# Patient Record
Sex: MALE | Race: WHITE | NOT HISPANIC OR LATINO | URBAN - METROPOLITAN AREA
[De-identification: names, ages, dates, MRNs, and addresses within clinical notes are randomized per-mention and may not be internally consistent; named-entity substitution may affect disease eponyms.]

---

## 2017-12-05 PROBLEM — 266433003 GASTRO-ESOPHAGEAL REFLUX DISEASE WITH ESOPHAGITIS: Status: ACTIVE | Noted: 2017-12-05

## 2020-06-02 ENCOUNTER — OFFICE VISIT (OUTPATIENT)
Dept: URBAN - METROPOLITAN AREA CLINIC 35 | Facility: CLINIC | Age: 55
End: 2020-06-02

## 2020-06-02 VITALS — BODY MASS INDEX: 31.5 KG/M2 | WEIGHT: 225 LBS | HEIGHT: 71 IN

## 2020-06-02 RX ORDER — PIOGLITAZONE 45 MG/1
1 TABLET TABLET ORAL ONCE A DAY
Status: ACTIVE | COMMUNITY

## 2020-06-02 RX ORDER — PANTOPRAZOLE 20 MG/1
1 TABLET TABLET, DELAYED RELEASE ORAL ONCE A DAY
Qty: 90 TABLET | Refills: 3 | Status: ACTIVE | COMMUNITY
Start: 2017-12-05

## 2020-06-02 RX ORDER — PANTOPRAZOLE 20 MG/1
1 TABLET TABLET, DELAYED RELEASE ORAL ONCE A DAY
Qty: 90 TABLET | Refills: 1 | OUTPATIENT
Start: 2017-12-05

## 2020-06-02 RX ORDER — ROSUVASTATIN CALCIUM 40 MG/1
1 TABLET TABLET, FILM COATED ORAL ONCE A DAY
Status: ACTIVE | COMMUNITY

## 2020-06-02 RX ORDER — INSULIN DETEMIR 100 [IU]/ML
INJECT 20 UNITS UNDER THE SKIN TWICE DAILY BEFORE MEAL(S) INJECTION, SOLUTION SUBCUTANEOUS
Qty: 15 UNSPECIFIED | Refills: 5 | Status: ACTIVE | COMMUNITY

## 2020-06-02 RX ORDER — LISINOPRIL 10 MG/1
1 TABLET TABLET ORAL ONCE A DAY
Status: ACTIVE | COMMUNITY

## 2020-06-02 NOTE — HPI-MIGRATED HPI
;   ;     Gastroesophageal reflux disease : He admit control of symptoms with the use of  Pantoprazole 20 mg 1 QD.  Last visit (12/3/2019)  Patient admits the continued use of Pantoprazole 20 mg. He denies that his symptoms are controlled with the use of Pantoprazole 20 mg. He admits that he will have break though symptoms several times a week.   Last visit (11/26/2019) Continue Pantoprazole Sodium Tablet Delayed Release, 20 MG,   Last visit (3/26/2019) He continue to admit control of symptoms with the use of Pantoprazole 20 mg 1 QD.  He denies any breakthrough episodes.  Denies dysphagia, early satiety, bloating, gas, abdominal pain, nausea.   Last visit (12/5/2017) Patient presents today for a follow up of GERD.  He continue to take Pantoprazole 40 mg daily with good control of symptoms.  He denies any breakthrough episodes.  Denies dysphagia, early satiety, bloating, gas, abdominal pain, nausea. ;   Montalvo's Esophagus : Patient presents today via televisit with consent for a follow up of Montalvo's Esophagus, which he was found to have via EGD completed Dec. 7, 2016.  He continue to take Pantoprazole 20 MG, 1 QD.  Denies globus, dysphagia, heartburn.reflux or odynophagia.  Need to repeat EGD in 2019.    Last visit (12/3/2019) Patient presents today for Montalvo's Esophagus. Patient admits a sour taste in his mouth at this time.   Last visit (3/26/2019) Patient presents today for a follow up of Montalvo's esophagus, which he was found to have via EGD completed Dec. 7, 2016.  Start Pantoprazole 20 MG, 1 QD Notes: EGD in 2019. ;

## 2020-12-01 ENCOUNTER — OFFICE VISIT (OUTPATIENT)
Dept: URBAN - METROPOLITAN AREA CLINIC 31 | Facility: CLINIC | Age: 55
End: 2020-12-01

## 2020-12-08 ENCOUNTER — OFFICE VISIT (OUTPATIENT)
Dept: URBAN - METROPOLITAN AREA CLINIC 31 | Facility: CLINIC | Age: 55
End: 2020-12-08

## 2020-12-08 VITALS — TEMPERATURE: 96.8 F | HEIGHT: 71 IN | WEIGHT: 230 LBS | BODY MASS INDEX: 32.2 KG/M2

## 2020-12-08 RX ORDER — ROSUVASTATIN CALCIUM 40 MG/1
1 TABLET TABLET, FILM COATED ORAL ONCE A DAY
Status: ACTIVE | COMMUNITY

## 2020-12-08 RX ORDER — INSULIN DETEMIR 100 [IU]/ML
INJECT 20 UNITS UNDER THE SKIN TWICE DAILY BEFORE MEAL(S) INJECTION, SOLUTION SUBCUTANEOUS
Qty: 15 UNSPECIFIED | Refills: 5 | Status: ACTIVE | COMMUNITY

## 2020-12-08 RX ORDER — PANTOPRAZOLE 20 MG/1
1 TABLET TABLET, DELAYED RELEASE ORAL ONCE A DAY
Qty: 90 TABLET | Refills: 1 | Status: ACTIVE | COMMUNITY
Start: 2017-12-05

## 2020-12-08 RX ORDER — PANTOPRAZOLE 20 MG/1
1 TABLET TABLET, DELAYED RELEASE ORAL ONCE A DAY
Qty: 90 TABLET | Refills: 1 | OUTPATIENT
Start: 2017-12-05

## 2020-12-08 RX ORDER — PIOGLITAZONE 45 MG/1
1 TABLET TABLET ORAL ONCE A DAY
Status: ACTIVE | COMMUNITY

## 2020-12-08 RX ORDER — LISINOPRIL 10 MG/1
1 TABLET TABLET ORAL ONCE A DAY
Status: ACTIVE | COMMUNITY

## 2020-12-08 NOTE — HPI-MIGRATED HPI
;   ;     Gastroesophageal reflux disease : Patient admits control of symptoms with the use of Pantoprazole 20 mg 1 QD.     Last visit (6/2/2020) He admit control of symptoms with the use of  Pantoprazole 20 mg 1 QD.  Last visit (12/3/2019)  Patient admits the continued use of Pantoprazole 20 mg. He denies that his symptoms are controlled with the use of Pantoprazole 20 mg. He admits that he will have break though symptoms several times a week.   Last visit (11/26/2019) Continue Pantoprazole Sodium Tablet Delayed Release, 20 MG,   Last visit (3/26/2019) He continue to admit control of symptoms with the use of Pantoprazole 20 mg 1 QD.  He denies any breakthrough episodes.  Denies dysphagia, early satiety, bloating, gas, abdominal pain, nausea.   Last visit (12/5/2017) Patient presents today for a follow up of GERD.  He continue to take Pantoprazole 40 mg daily with good control of symptoms.  He denies any breakthrough episodes.  Denies dysphagia, early satiety, bloating, gas, abdominal pain, nausea. ;   Montalvo's Esophagus : Patient presents today for a follow up of Montalvo's Esophagus. Continue to take Pantoprazole 20 MG 1 QD.   He denies any associated symptoms at this time.    Last visit (6/2/2020) Patient presents today via televisit with consent for a follow up of Montalvo's Esophagus, which he was found to have via EGD completed Dec. 7, 2016.  He continue to take Pantoprazole 20 MG, 1 QD.  Denies globus, dysphagia, heartburn/reflux or odynophagia.  Need to repeat EGD in 2019.    Last visit (12/3/2019) Patient presents today for Montalvo's Esophagus. Patient admits a sour taste in his mouth at this time.   Last visit (3/26/2019) Patient presents today for a follow up of Montalvo's esophagus, which he was found to have via EGD completed Dec. 7, 2016.  Start Pantoprazole 20 MG, 1 QD Notes: EGD in 2019. ;

## 2021-07-20 ENCOUNTER — OFFICE VISIT (OUTPATIENT)
Dept: URBAN - METROPOLITAN AREA CLINIC 31 | Facility: CLINIC | Age: 56
End: 2021-07-20

## 2021-07-20 VITALS
DIASTOLIC BLOOD PRESSURE: 88 MMHG | HEART RATE: 74 BPM | HEIGHT: 71 IN | OXYGEN SATURATION: 95 % | SYSTOLIC BLOOD PRESSURE: 134 MMHG | WEIGHT: 200 LBS | BODY MASS INDEX: 28 KG/M2

## 2021-07-20 RX ORDER — PANTOPRAZOLE 20 MG/1
1 TABLET TABLET, DELAYED RELEASE ORAL ONCE A DAY
Qty: 90 TABLET | Refills: 1 | Status: ACTIVE | COMMUNITY
Start: 2017-12-05

## 2021-07-20 RX ORDER — EMPAGLIFLOZIN 10 MG/1
TABLET, FILM COATED ORAL
Qty: 30 | Status: ACTIVE | COMMUNITY

## 2021-07-20 RX ORDER — PIOGLITAZONE 45 MG/1
1 TABLET TABLET ORAL ONCE A DAY
Status: ACTIVE | COMMUNITY

## 2021-07-20 RX ORDER — INSULIN DETEMIR 100 [IU]/ML
INJECT 20 UNITS UNDER THE SKIN TWICE DAILY BEFORE MEAL(S) INJECTION, SOLUTION SUBCUTANEOUS
Qty: 15 UNSPECIFIED | Refills: 5 | Status: ACTIVE | COMMUNITY

## 2021-07-20 RX ORDER — PANTOPRAZOLE 20 MG/1
1 TABLET TABLET, DELAYED RELEASE ORAL ONCE A DAY
OUTPATIENT
Start: 2017-12-05

## 2021-07-20 RX ORDER — LISINOPRIL 10 MG/1
1 TABLET TABLET ORAL ONCE A DAY
Status: ACTIVE | COMMUNITY

## 2021-07-20 RX ORDER — ROSUVASTATIN CALCIUM 40 MG/1
1 TABLET TABLET, FILM COATED ORAL ONCE A DAY
Status: ACTIVE | COMMUNITY

## 2021-07-20 NOTE — HPI-MIGRATED HPI
;   ;     Gastroesophageal reflux disease : He continue to take Pantoprazole 20 MG 1 QD with control of symptoms.   He denies any breakthrough symptoms.   Last visit (12/8/2020) Patient admits control of symptoms with the use of Pantoprazole 20 mg 1 QD.     Last visit (6/2/2020) He admit control of symptoms with the use of  Pantoprazole 20 mg 1 QD.  Last visit (12/3/2019)  Patient admits the continued use of Pantoprazole 20 mg. He denies that his symptoms are controlled with the use of Pantoprazole 20 mg. He admits that he will have break though symptoms several times a week.   Last visit (11/26/2019) Continue Pantoprazole Sodium Tablet Delayed Release, 20 MG,   Last visit (3/26/2019) He continue to admit control of symptoms with the use of Pantoprazole 20 mg 1 QD.  He denies any breakthrough episodes.  Denies dysphagia, early satiety, bloating, gas, abdominal pain, nausea.   Last visit (12/5/2017) Patient presents today for a follow up of GERD.  He continue to take Pantoprazole 40 mg daily with good control of symptoms.  He denies any breakthrough episodes.  Denies dysphagia, early satiety, bloating, gas, abdominal pain, nausea.;   Montalvo's Esophagus : Patient presents today for a follow up of Montalvo's Esophagus.  He continue to take Pantoprazole 20 MG 1 QD with control of symptoms.   He denies any breakthrough symptoms at this time.  He has been trying to loose weight with adhering to the Optivia Diet with great results.  Total of 30 lb weight loss since last visit.    Last visit (12/8/2020) Patient presents today for a follow up of Montalvo's Esophagus. Continue to take Pantoprazole 20 MG 1 QD.   He denies any associated symptoms at this time.    Last visit (6/2/2020) Patient presents today via televisit with consent for a follow up of Montalvo's Esophagus, which he was found to have via EGD completed Dec. 7, 2016.  He continue to take Pantoprazole 20 MG, 1 QD.  Denies globus, dysphagia, heartburn/reflux or odynophagia.  Need to repeat EGD in 2019.    Last visit (12/3/2019) Patient presents today for Montalvo's Esophagus. Patient admits a sour taste in his mouth at this time.   Last visit (3/26/2019) Patient presents today for a follow up of Montalvo's esophagus, which he was found to have via EGD completed Dec. 7, 2016.  Start Pantoprazole 20 MG, 1 QD Notes: EGD in 2019.;

## 2022-01-18 ENCOUNTER — OFFICE VISIT (OUTPATIENT)
Dept: URBAN - METROPOLITAN AREA CLINIC 31 | Facility: CLINIC | Age: 57
End: 2022-01-18

## 2022-01-18 VITALS
WEIGHT: 205 LBS | DIASTOLIC BLOOD PRESSURE: 78 MMHG | BODY MASS INDEX: 28.7 KG/M2 | HEIGHT: 71 IN | SYSTOLIC BLOOD PRESSURE: 138 MMHG | OXYGEN SATURATION: 97 % | HEART RATE: 81 BPM

## 2022-01-18 DIAGNOSIS — K22.70 BARRETT'S ESOPHAGUS WITHOUT DYSPLASIA: ICD-10-CM

## 2022-01-18 PROCEDURE — 99213 OFFICE O/P EST LOW 20 MIN: CPT | Performed by: INTERNAL MEDICINE

## 2022-01-18 RX ORDER — INSULIN DETEMIR 100 [IU]/ML
INJECT 20 UNITS UNDER THE SKIN TWICE DAILY BEFORE MEAL(S) INJECTION, SOLUTION SUBCUTANEOUS
Qty: 15 UNSPECIFIED | Refills: 5 | Status: DISCONTINUED | COMMUNITY

## 2022-01-18 RX ORDER — PIOGLITAZONE 45 MG/1
1 TABLET TABLET ORAL ONCE A DAY
Status: ACTIVE | COMMUNITY

## 2022-01-18 RX ORDER — EMPAGLIFLOZIN 10 MG/1
TABLET, FILM COATED ORAL
Qty: 30 | Status: ACTIVE | COMMUNITY

## 2022-01-18 RX ORDER — LISINOPRIL 10 MG/1
1 TABLET TABLET ORAL ONCE A DAY
Status: ACTIVE | COMMUNITY

## 2022-01-18 RX ORDER — PANTOPRAZOLE 20 MG/1
1 TABLET TABLET, DELAYED RELEASE ORAL ONCE A DAY
Status: ACTIVE | COMMUNITY
Start: 2017-12-05

## 2022-01-18 RX ORDER — PANTOPRAZOLE 20 MG/1
1 TABLET TABLET, DELAYED RELEASE ORAL ONCE A DAY
Qty: 90 TABLET | Refills: 3 | OUTPATIENT

## 2022-01-18 RX ORDER — ROSUVASTATIN CALCIUM 40 MG/1
1 TABLET TABLET, FILM COATED ORAL ONCE A DAY
Status: ACTIVE | COMMUNITY

## 2022-01-18 NOTE — HPI-GERD
He continue the use of  Pantoprazole 20 mg 1 QD  with control of symptoms.      Last visit (7/20/2021)  He continue to take Pantoprazole 20 MG 1 QD with control of symptoms. He denies any breakthrough symptoms.        Last visit (12/8/2020) Patient admits control of symptoms with the use of Pantoprazole 20 mg 1 QD.         Last visit (6/2/2020) He admit control of symptoms with the use of Pantoprazole 20 mg 1 QD.        Last visit (12/3/2019) Patient admits the continued use of Pantoprazole 20 mg. He denies that his symptoms are controlled with the use of Pantoprazole 20 mg. He admits that he will have break though symptoms several times a week.         Last visit (11/26/2019)        Continue Pantoprazole Sodium Tablet Delayed Release, 20 MG,         Last visit (3/26/2019)        He continue to admit control of symptoms with the use of Pantoprazole 20 mg 1 QD. He denies any breakthrough episodes. Denies dysphagia, early satiety, bloating, gas, abdominal pain, nausea.        Last visit (12/5/2017) Patient presents today for a follow up of GERD. He continue to take Pantoprazole 40 mg daily with good control of symptoms. He denies any breakthrough episodes. Denies dysphagia, early satiety, bloating, gas, abdominal pain, nausea.

## 2022-01-18 NOTE — HPI-BARRETT'S ESOPHAGUS
Patient presents today for a follow up of Montalvo's esophagus without dysplasia.  He continue to deny  stmptoms of dysphagia, globus, sour eructations, bloating/gas, indigestion, early satiety, changes in appetite, coughing, abdominal/epigastric pain, or changes in bowel habits.    Last visit (7/20/2021)   Patient presents today for a follow up of Montalvo's Esophagus. He continue to take Pantoprazole 20 MG 1 QD with control of symptoms. He denies any breakthrough symptoms at this time.        He has been trying to loose weight with adhering to the Optivia Diet with great results. Total of 30 lb weight loss since last visit.         Last visit (12/8/2020) Patient presents today for a follow up of Montalvo's Esophagus. Continue to take Pantoprazole 20 MG 1 QD. He denies any associated symptoms at this time.        Last visit (6/2/2020) Patient presents today via televisit with consent for a follow up of Montalvo's Esophagus, which he was found to have via EGD completed Dec. 7, 2016. He continue to take Pantoprazole 20 MG, 1 QD. Denies globus, dysphagia, heartburn/reflux or odynophagia.        Need to repeat EGD in 2019.         Last visit (12/3/2019) Patient presents today for Montalvo's Esophagus. Patient admits a sour taste in his mouth at this time.         Last visit (3/26/2019)        Patient presents today for a follow up of Montalvo's esophagus, which he was found to have via EGD completed Dec. 7, 2016. Start Pantoprazole 20 MG, 1 QD        Notes: EGD in 2019.

## 2022-04-19 ENCOUNTER — TELEPHONE ENCOUNTER (OUTPATIENT)
Dept: URBAN - METROPOLITAN AREA CLINIC 36 | Facility: CLINIC | Age: 57
End: 2022-04-19

## 2022-04-19 ENCOUNTER — OFFICE VISIT (OUTPATIENT)
Dept: URBAN - METROPOLITAN AREA CLINIC 31 | Facility: CLINIC | Age: 57
End: 2022-04-19

## 2022-04-19 VITALS
BODY MASS INDEX: 29.4 KG/M2 | HEIGHT: 71 IN | WEIGHT: 210 LBS | HEART RATE: 87 BPM | SYSTOLIC BLOOD PRESSURE: 120 MMHG | DIASTOLIC BLOOD PRESSURE: 76 MMHG

## 2022-04-19 DIAGNOSIS — Z12.11 SCREENING FOR COLON CANCER: ICD-10-CM

## 2022-04-19 DIAGNOSIS — Z12.12 SCREENING FOR RECTAL CANCER: ICD-10-CM

## 2022-04-19 DIAGNOSIS — K22.70 BARRETT'S ESOPHAGUS WITHOUT DYSPLASIA: ICD-10-CM

## 2022-04-19 DIAGNOSIS — K21.00 GASTRO-ESOPHAGEAL REFLUX DISEASE WITH ESOPHAGITIS, WITHOUT BLEEDING: ICD-10-CM

## 2022-04-19 PROCEDURE — 99214 OFFICE O/P EST MOD 30 MIN: CPT | Performed by: INTERNAL MEDICINE

## 2022-04-19 RX ORDER — PIOGLITAZONE 45 MG/1
1 TABLET TABLET ORAL ONCE A DAY
Status: ACTIVE | COMMUNITY

## 2022-04-19 RX ORDER — EMPAGLIFLOZIN 10 MG/1
TABLET, FILM COATED ORAL
Qty: 30 | Status: ACTIVE | COMMUNITY

## 2022-04-19 RX ORDER — SODIUM, POTASSIUM,MAG SULFATES 17.5-3.13G
177ML SOLUTION, RECONSTITUTED, ORAL ORAL ONCE A DAY
Qty: 354 ML | Refills: 0 | OUTPATIENT
Start: 2022-04-19 | End: 2022-04-21

## 2022-04-19 RX ORDER — PANTOPRAZOLE 20 MG/1
1 TABLET TABLET, DELAYED RELEASE ORAL ONCE A DAY
Qty: 90 TABLET | Refills: 3 | Status: ACTIVE | COMMUNITY

## 2022-04-19 RX ORDER — PANTOPRAZOLE 20 MG/1
1 TABLET TABLET, DELAYED RELEASE ORAL ONCE A DAY
Qty: 90 TABLET | Refills: 3

## 2022-04-19 RX ORDER — ROSUVASTATIN CALCIUM 40 MG/1
1 TABLET TABLET, FILM COATED ORAL ONCE A DAY
Status: ACTIVE | COMMUNITY

## 2022-04-19 RX ORDER — PANTOPRAZOLE 20 MG/1
1 TABLET TABLET, DELAYED RELEASE ORAL ONCE A DAY
Qty: 90 TABLET | Refills: 3 | OUTPATIENT

## 2022-04-19 RX ORDER — LISINOPRIL 10 MG/1
1 TABLET TABLET ORAL ONCE A DAY
Status: ACTIVE | COMMUNITY

## 2022-04-19 NOTE — HPI-BARRETT'S ESOPHAGUS
Patient presents today for a follow up of Montalvo's esophagus without dysplasia.  He ran out of the Pantoprazole 20 MG 1 QD a month ago and has been experiencing heartburn/reflux symtoms.  He has been taking Tums with temporary relief.   Last visit (1/18/2022) Patient presents today for a follow up of Montalvo's esophagus without dysplasia.  He continue to deny  stmptoms of dysphagia, globus, sour eructations, bloating/gas, indigestion, early satiety, changes in appetite, coughing, abdominal/epigastric pain, or changes in bowel habits.    Last visit (7/20/2021)   Patient presents today for a follow up of Montalvo's Esophagus. He continue to take Pantoprazole 20 MG 1 QD with control of symptoms. He denies any breakthrough symptoms at this time.        He has been trying to loose weight with adhering to the Optivia Diet with great results. Total of 30 lb weight loss since last visit.         Last visit (12/8/2020) Patient presents today for a follow up of Montalvo's Esophagus. Continue to take Pantoprazole 20 MG 1 QD. He denies any associated symptoms at this time.        Last visit (6/2/2020) Patient presents today via televisit with consent for a follow up of Montalvo's Esophagus, which he was found to have via EGD completed Dec. 7, 2016. He continue to take Pantoprazole 20 MG, 1 QD. Denies globus, dysphagia, heartburn/reflux or odynophagia.        Need to repeat EGD in 2019.         Last visit (12/3/2019) Patient presents today for Montalvo's Esophagus. Patient admits a sour taste in his mouth at this time.         Last visit (3/26/2019)        Patient presents today for a follow up of Montalvo's esophagus, which he was found to have via EGD completed Dec. 7, 2016. Start Pantoprazole 20 MG, 1 QD        Notes: EGD in 2019.

## 2022-04-19 NOTE — HPI-GERD
He has been experiencing heartburn/reflux symtoms since he ran out of the Pantoprazole 20 MG 1 QD a month ago.  He has been taking Tums with temporary relief.  Last visit (1/18/2022) He continue the use of  Pantoprazole 20 mg 1 QD  with control of symptoms.    Last visit (7/20/2021)  He continue to take Pantoprazole 20 MG 1 QD with control of symptoms. He denies any breakthrough symptoms.        Last visit (12/8/2020) Patient admits control of symptoms with the use of Pantoprazole 20 mg 1 QD.         Last visit (6/2/2020) He admit control of symptoms with the use of Pantoprazole 20 mg 1 QD.        Last visit (12/3/2019) Patient admits the continued use of Pantoprazole 20 mg. He denies that his symptoms are controlled with the use of Pantoprazole 20 mg. He admits that he will have break though symptoms several times a week.         Last visit (11/26/2019)        Continue Pantoprazole Sodium Tablet Delayed Release, 20 MG,         Last visit (3/26/2019)        He continue to admit control of symptoms with the use of Pantoprazole 20 mg 1 QD. He denies any breakthrough episodes. Denies dysphagia, early satiety, bloating, gas, abdominal pain, nausea.        Last visit (12/5/2017) Patient presents today for a follow up of GERD. He continue to take Pantoprazole 40 mg daily with good control of symptoms. He denies any breakthrough episodes. Denies dysphagia, early satiety, bloating, gas, abdominal pain, nausea.

## 2022-05-09 ENCOUNTER — TELEPHONE ENCOUNTER (OUTPATIENT)
Dept: URBAN - METROPOLITAN AREA CLINIC 33 | Facility: CLINIC | Age: 57
End: 2022-05-09

## 2022-06-14 ENCOUNTER — OFFICE VISIT (OUTPATIENT)
Dept: URBAN - METROPOLITAN AREA CLINIC 31 | Facility: CLINIC | Age: 57
End: 2022-06-14

## 2022-06-17 ENCOUNTER — OFFICE VISIT (OUTPATIENT)
Dept: URBAN - METROPOLITAN AREA SURGERY CENTER 8 | Facility: SURGERY CENTER | Age: 57
End: 2022-06-17

## 2022-06-28 ENCOUNTER — TELEPHONE ENCOUNTER (OUTPATIENT)
Dept: URBAN - METROPOLITAN AREA CLINIC 80 | Facility: CLINIC | Age: 57
End: 2022-06-28

## 2022-06-28 RX ORDER — PANTOPRAZOLE 20 MG/1
1 TABLET TABLET, DELAYED RELEASE ORAL ONCE A DAY
Qty: 90 TABLET | Refills: 3 | Status: ACTIVE | COMMUNITY

## 2022-06-28 RX ORDER — ROSUVASTATIN CALCIUM 40 MG/1
1 TABLET TABLET, FILM COATED ORAL ONCE A DAY
Status: ACTIVE | COMMUNITY

## 2022-06-28 RX ORDER — SODIUM PICOSULFATE, MAGNESIUM OXIDE, AND ANHYDROUS CITRIC ACID 10; 3.5; 12 MG/160ML; G/160ML; G/160ML
160 ML LIQUID ORAL AS DIRECTED
Qty: 160 MILLILITER | Refills: 0 | OUTPATIENT

## 2022-06-28 RX ORDER — EMPAGLIFLOZIN 10 MG/1
TABLET, FILM COATED ORAL
Qty: 30 | Status: ACTIVE | COMMUNITY

## 2022-06-28 RX ORDER — PIOGLITAZONE 45 MG/1
1 TABLET TABLET ORAL ONCE A DAY
Status: ACTIVE | COMMUNITY

## 2022-06-28 RX ORDER — LISINOPRIL 10 MG/1
1 TABLET TABLET ORAL ONCE A DAY
Status: ACTIVE | COMMUNITY

## 2022-06-29 PROBLEM — 305058001: Status: ACTIVE | Noted: 2022-04-19

## 2022-07-01 ENCOUNTER — CLAIMS CREATED FROM THE CLAIM WINDOW (OUTPATIENT)
Dept: URBAN - METROPOLITAN AREA CLINIC 4 | Facility: CLINIC | Age: 57
End: 2022-07-01

## 2022-07-01 ENCOUNTER — OFFICE VISIT (OUTPATIENT)
Dept: URBAN - METROPOLITAN AREA SURGERY CENTER 8 | Facility: SURGERY CENTER | Age: 57
End: 2022-07-01

## 2022-07-01 DIAGNOSIS — K22.70 BARRETT ESOPHAGUS: ICD-10-CM

## 2022-07-01 DIAGNOSIS — K22.719 BARRETT'S ESOPHAGUS WITH DYSPLASIA, UNSPECIFIED: ICD-10-CM

## 2022-07-01 DIAGNOSIS — K31.89 ACQUIRED DEFORMITY OF DUODENUM: ICD-10-CM

## 2022-07-01 DIAGNOSIS — Z12.11 COLON CANCER SCREENING: ICD-10-CM

## 2022-07-01 DIAGNOSIS — K63.89 BACTERIAL OVERGROWTH SYNDROME: ICD-10-CM

## 2022-07-01 DIAGNOSIS — K31.89 OTHER DISEASES OF STOMACH AND DUODENUM: ICD-10-CM

## 2022-07-01 DIAGNOSIS — K63.89 OTHER SPECIFIED DISEASES OF INTESTINE: ICD-10-CM

## 2022-07-01 PROCEDURE — 88305 TISSUE EXAM BY PATHOLOGIST: CPT | Performed by: PATHOLOGY

## 2022-07-01 PROCEDURE — 43239 EGD BIOPSY SINGLE/MULTIPLE: CPT | Performed by: INTERNAL MEDICINE

## 2022-07-01 PROCEDURE — G8907 PT DOC NO EVENTS ON DISCHARG: HCPCS | Performed by: INTERNAL MEDICINE

## 2022-07-01 PROCEDURE — 45385 COLONOSCOPY W/LESION REMOVAL: CPT | Performed by: INTERNAL MEDICINE

## 2022-07-01 PROCEDURE — 88312 SPECIAL STAINS GROUP 1: CPT | Performed by: PATHOLOGY

## 2022-07-11 ENCOUNTER — TELEPHONE ENCOUNTER (OUTPATIENT)
Dept: URBAN - METROPOLITAN AREA CLINIC 35 | Facility: CLINIC | Age: 57
End: 2022-07-11

## 2022-07-12 ENCOUNTER — OFFICE VISIT (OUTPATIENT)
Dept: URBAN - METROPOLITAN AREA CLINIC 31 | Facility: CLINIC | Age: 57
End: 2022-07-12

## 2022-07-12 RX ORDER — PANTOPRAZOLE 20 MG/1
1 TABLET TABLET, DELAYED RELEASE ORAL ONCE A DAY
Qty: 90 TABLET | Refills: 3

## 2022-07-19 ENCOUNTER — OFFICE VISIT (OUTPATIENT)
Dept: URBAN - METROPOLITAN AREA CLINIC 31 | Facility: CLINIC | Age: 57
End: 2022-07-19

## 2022-07-19 VITALS
HEART RATE: 86 BPM | OXYGEN SATURATION: 96 % | SYSTOLIC BLOOD PRESSURE: 118 MMHG | HEIGHT: 71 IN | BODY MASS INDEX: 28.84 KG/M2 | DIASTOLIC BLOOD PRESSURE: 82 MMHG | WEIGHT: 206 LBS

## 2022-07-19 DIAGNOSIS — K21.00 GASTRO-ESOPHAGEAL REFLUX DISEASE WITH ESOPHAGITIS, WITHOUT BLEEDING: ICD-10-CM

## 2022-07-19 DIAGNOSIS — K57.30 DIVERTICULOSIS OF LARGE INTESTINE WITHOUT PERFORATION OR ABSCESS WITHOUT BLEEDING: ICD-10-CM

## 2022-07-19 DIAGNOSIS — K22.70 BARRETT'S ESOPHAGUS WITHOUT DYSPLASIA: ICD-10-CM

## 2022-07-19 DIAGNOSIS — K63.5 HYPERPLASTIC POLYP OF SIGMOID COLON: ICD-10-CM

## 2022-07-19 PROBLEM — 89452002: Status: ACTIVE | Noted: 2022-07-19

## 2022-07-19 PROCEDURE — 99214 OFFICE O/P EST MOD 30 MIN: CPT | Performed by: INTERNAL MEDICINE

## 2022-07-19 RX ORDER — PANTOPRAZOLE 20 MG/1
1 TABLET TABLET, DELAYED RELEASE ORAL ONCE A DAY
Qty: 90 TABLET | Refills: 3 | Status: ACTIVE | COMMUNITY

## 2022-07-19 RX ORDER — PIOGLITAZONE 45 MG/1
1 TABLET TABLET ORAL ONCE A DAY
Status: ACTIVE | COMMUNITY

## 2022-07-19 RX ORDER — SODIUM PICOSULFATE, MAGNESIUM OXIDE, AND ANHYDROUS CITRIC ACID 10; 3.5; 12 MG/160ML; G/160ML; G/160ML
160 ML LIQUID ORAL AS DIRECTED
Qty: 160 MILLILITER | Refills: 0 | Status: ON HOLD | COMMUNITY

## 2022-07-19 RX ORDER — PANTOPRAZOLE 20 MG/1
1 TABLET TABLET, DELAYED RELEASE ORAL ONCE A DAY
Qty: 90 TABLET | Refills: 3 | OUTPATIENT

## 2022-07-19 RX ORDER — ROSUVASTATIN CALCIUM 40 MG/1
1 TABLET TABLET, FILM COATED ORAL ONCE A DAY
Status: ACTIVE | COMMUNITY

## 2022-07-19 RX ORDER — EMPAGLIFLOZIN 25 MG/1
1 TABLET TABLET, FILM COATED ORAL ONCE A DAY
Status: ACTIVE | COMMUNITY

## 2022-07-19 RX ORDER — LISINOPRIL 10 MG/1
1 TABLET TABLET ORAL ONCE A DAY
Status: ACTIVE | COMMUNITY

## 2022-07-19 NOTE — HPI-GERD
Patient admits continued use of Pantoprazole 20 MG 1 QD with relief of symptoms.  Last Visit (4/19/22) He has been experiencing heartburn/reflux symtoms since he ran out of the Pantoprazole 20 MG 1 QD a month ago.  He has been taking Tums with temporary relief.

## 2022-07-19 NOTE — HPI-BARRETT'S ESOPHAGUS
Patient presents today to review EGD/Colonoscopy results and Montalvo's Esophagus follow up. He denies any complications after his procedure. He currently reports 1 bowel movement per day, without strain. His stools are normal and formed. He denies any mucus, or melena. He occasioanl experience blood in stools due to hemorrhoids. Denies any pruritus ani or rectal pain.  He currently denies any episodes of dysphagia, globus, a change in appetite or bowel habits since his procedure.  Patient admits continued use of Pantoprazole 20 MG 1 QD with relief of symptoms.  Last Visit (4/19/22) Patient presents today for a follow up of Montalvo's esophagus without dysplasia.  He ran out of the Pantoprazole 20 MG 1 QD a month ago and has been experiencing heartburn/reflux symtoms.  He has been taking Tums with temporary relief.

## 2023-07-18 ENCOUNTER — OFFICE VISIT (OUTPATIENT)
Dept: URBAN - METROPOLITAN AREA CLINIC 31 | Facility: CLINIC | Age: 58
End: 2023-07-18

## 2023-07-18 RX ORDER — PANTOPRAZOLE 20 MG/1
1 TABLET TABLET, DELAYED RELEASE ORAL ONCE A DAY
Qty: 90 TABLET | Refills: 3 | Status: ACTIVE | COMMUNITY

## 2023-07-18 RX ORDER — PANTOPRAZOLE 20 MG/1
1 TABLET TABLET, DELAYED RELEASE ORAL ONCE A DAY
Qty: 90 TABLET | Refills: 3 | OUTPATIENT

## 2023-07-18 RX ORDER — EMPAGLIFLOZIN 25 MG/1
1 TABLET TABLET, FILM COATED ORAL ONCE A DAY
Status: ACTIVE | COMMUNITY

## 2023-07-18 RX ORDER — LISINOPRIL 10 MG/1
1 TABLET TABLET ORAL ONCE A DAY
Status: ACTIVE | COMMUNITY

## 2023-07-18 RX ORDER — SODIUM PICOSULFATE, MAGNESIUM OXIDE, AND ANHYDROUS CITRIC ACID 10; 3.5; 12 MG/160ML; G/160ML; G/160ML
160 ML LIQUID ORAL AS DIRECTED
Qty: 160 MILLILITER | Refills: 0 | Status: ON HOLD | COMMUNITY

## 2023-07-18 RX ORDER — PIOGLITAZONE 45 MG/1
1 TABLET TABLET ORAL ONCE A DAY
Status: ACTIVE | COMMUNITY

## 2023-07-18 RX ORDER — ROSUVASTATIN CALCIUM 40 MG/1
1 TABLET TABLET, FILM COATED ORAL ONCE A DAY
Status: ACTIVE | COMMUNITY

## 2023-07-18 NOTE — HPI-GERD
Patient admits continued use of Pantoprazole 20mg with/out control symptoms. Patient admits/denies any recent flares or issues with his gerd at this time.   (Last Visit 07.19.2022) Patient admits continued use of Pantoprazole 20 MG 1 QD with relief of symptoms.  Last Visit (4/19/22) He has been experiencing heartburn/reflux symtoms since he ran out of the Pantoprazole 20 MG 1 QD a month ago.  He has been taking Tums with temporary relief.

## 2023-07-18 NOTE — HPI-BARRETT'S ESOPHAGUS
Patient present today for a yearly follow-up of Montalvo's esophagus. Patient admits/denies any recent flares since his last visit. He admits continued use of Pantoprazole 20mg with/out control symptoms. Patient admits/denies any associated symptoms of dyspepsia, dysphagia, excessive belching, globus, sour eructations, bloating/gas, early satiety, changes in appetite, coughing, abdominal/epigastric pain, or changes in bowel habits.  Most recent labs completed on 05.16.2023 revealing Hct: 53.9H, Glucose: 186H. BUN: 33H, Creatinine: 1.51H, eGFR: 54L, BUN/Creatinine Ratio: 22H, Triglycerides:470H, HDL Cholesterol: 34L, VLDL Cholesterol Esteban: 76H, T. Chol/HDL Ratio: 5.8H, Hgb A1c: 10.2H, all other labs were normal.  Patient also had labs completed on 02.20.2023 showing Glucose: 213H, BUN: 42H, Creatinine: 1.65H, eGFR: 49L, BUN/Creatinine Ratio: 25H, Sodium: 133L, Triglycerides:369H, HDL Cholesterol: 31L, VLDL Cholesterol Esteban: 62H, Hgb A1c: 8.4H, Testosterone: 240L, all other labs were normal.   (Last Visit 07.19.2022) Patient presents today to review EGD/Colonoscopy results and Montalvo's Esophagus follow up. He denies any complications after his procedure. He currently reports 1 bowel movement per day, without strain. His stools are normal and formed. He denies any mucus, or melena. He occasioanl experience blood in stools due to hemorrhoids. Denies any pruritus ani or rectal pain.  He currently denies any episodes of dysphagia, globus, a change in appetite or bowel habits since his procedure.  Patient admits continued use of Pantoprazole 20 MG 1 QD with relief of symptoms.  Last Visit (4/19/22) Patient presents today for a follow up of Montalvo's esophagus without dysplasia.  He ran out of the Pantoprazole 20 MG 1 QD a month ago and has been experiencing heartburn/reflux symtoms.  He has been taking Tums with temporary relief.

## 2023-10-24 ENCOUNTER — OFFICE VISIT (OUTPATIENT)
Dept: URBAN - METROPOLITAN AREA CLINIC 31 | Facility: CLINIC | Age: 58
End: 2023-10-24

## 2023-11-08 ENCOUNTER — DASHBOARD ENCOUNTERS (OUTPATIENT)
Age: 58
End: 2023-11-08

## 2023-11-14 ENCOUNTER — OFFICE VISIT (OUTPATIENT)
Dept: URBAN - METROPOLITAN AREA CLINIC 31 | Facility: CLINIC | Age: 58
End: 2023-11-14

## 2023-11-14 RX ORDER — PANTOPRAZOLE 20 MG/1
1 TABLET TABLET, DELAYED RELEASE ORAL ONCE A DAY
Qty: 90 TABLET | Refills: 3 | Status: ACTIVE | COMMUNITY

## 2023-11-14 RX ORDER — PANTOPRAZOLE 20 MG/1
1 TABLET TABLET, DELAYED RELEASE ORAL ONCE A DAY
Qty: 90 TABLET | Refills: 3 | OUTPATIENT

## 2023-11-14 RX ORDER — PIOGLITAZONE 45 MG/1
1 TABLET TABLET ORAL ONCE A DAY
Status: ACTIVE | COMMUNITY

## 2023-11-14 RX ORDER — EMPAGLIFLOZIN 25 MG/1
1 TABLET TABLET, FILM COATED ORAL ONCE A DAY
Status: ACTIVE | COMMUNITY

## 2023-11-14 RX ORDER — LISINOPRIL 10 MG/1
1 TABLET TABLET ORAL ONCE A DAY
Status: ACTIVE | COMMUNITY

## 2023-11-14 RX ORDER — SODIUM PICOSULFATE, MAGNESIUM OXIDE, AND ANHYDROUS CITRIC ACID 10; 3.5; 12 MG/160ML; G/160ML; G/160ML
160 ML LIQUID ORAL AS DIRECTED
Qty: 160 MILLILITER | Refills: 0 | Status: ON HOLD | COMMUNITY

## 2023-11-14 RX ORDER — ROSUVASTATIN CALCIUM 40 MG/1
1 TABLET TABLET, FILM COATED ORAL ONCE A DAY
Status: ACTIVE | COMMUNITY